# Patient Record
Sex: FEMALE | URBAN - METROPOLITAN AREA
[De-identification: names, ages, dates, MRNs, and addresses within clinical notes are randomized per-mention and may not be internally consistent; named-entity substitution may affect disease eponyms.]

---

## 2018-04-18 ENCOUNTER — NURSE TRIAGE (OUTPATIENT)
Dept: NURSING | Facility: CLINIC | Age: 38
End: 2018-04-18

## 2018-04-18 NOTE — TELEPHONE ENCOUNTER
Reason for Disposition    [1] MODERATE difficulty breathing (e.g., speaks in phrases, SOB even at rest, pulse 100-120) AND [2] NEW-onset or WORSE than normal    Additional Information    Negative: [1] Breathing stopped AND [2] hasn't returned    Negative: Choking on something    Negative: Severe difficulty breathing (e.g., struggling for each breath, speaks in single words)    Negative: Bluish lips, tongue, or face now    Negative: Difficult to awaken or acting confused  (e.g., disoriented, slurred speech)    Negative: Passed out (i.e., lost consciousness, collapsed and was not responding)    Negative: Wheezing started suddenly after medicine, an allergic food or bee sting    Negative: Stridor    Negative: Slow, shallow and weak breathing    Negative: Sounds like a life-threatening emergency to the triager    Negative: Chest pain    Negative: [1] Wheezing (high pitched whistling sound) AND [2] previous asthma attacks or use of asthma medicines    Negative: [1] Difficulty breathing AND [2] only present when coughing    Negative: [1] Difficulty breathing AND [2] only from stuffy or runny nose    Protocols used: BREATHING DIFFICULTY-ADULT-AH

## 2018-04-18 NOTE — TELEPHONE ENCOUNTER
Patient called and tonsilitis along with sleep apnea due to enlarged tonsils on 4/16/18 and prescribed prednisone. Patient reports that she is now having difficulty breathing and her chest feels tight. Patient denies bluish lips/tongue/face, wheezing, stridor, coughing, and runny nose. Patient is able to speak in short phrases but sounded short of breath to this writer. Reviewed guidelines below and advised patient to go to the ER. Patient agreed with plan and reported she will go to the [facility] but she has to drive herself because she does not have anyone that can drive her. Advised patient to call 911 if her difficulty breathing becomes severe. RN advised to call back with any changes, worsening of symptoms, and questions or concerns.

## 2020-02-27 ENCOUNTER — NURSE TRIAGE (OUTPATIENT)
Dept: NURSING | Facility: CLINIC | Age: 40
End: 2020-02-27

## 2020-02-27 NOTE — TELEPHONE ENCOUNTER
Maira is calling in because she has a bad headache, and the veins in her head are popping out and she thinks it is her blood pressure. She says she is going through a very high stress loss last night and she wants to verify that she should be seen. I told her about going through our protocol questions to see where the best place for her to go is , starting with 911 and ED questions then Dr visits and home. She denies the need to do the questions because it's not an ED need. She denies any chest pain or trouble breathing at this time. She is going to go to urgent care to be seen. I told her it is something to be seen for sooner vs. Later and she agreed. Call back with any further concerns.    Patricia Valentine RN/ [facility]      Additional Information    Negative: [1] Numbness (i.e., loss of sensation) of the face, arm or leg on one side of the body AND [2] new onset    Negative: Severe difficulty breathing (e.g., struggling for each breath, speaks in single words)    Negative: Difficult to awaken or acting confused (e.g., disoriented, slurred speech)    Negative: [1] Chest pain lasts > 5 minutes AND [2] history of heart disease  (i.e., heart attack, bypass surgery, angina, angioplasty, CHF)    Negative: Sounds like a life-threatening emergency to the triager    Negative: [1] Chest pain AND [2] took nitrogylcerin AND [3] pain was not relieved    Protocols used: HIGH BLOOD PRESSURE-A-AH

## 2022-12-03 ENCOUNTER — NURSE TRIAGE (OUTPATIENT)
Dept: NURSING | Facility: CLINIC | Age: 42
End: 2022-12-03

## 2022-12-03 NOTE — TELEPHONE ENCOUNTER
"Patient is calling about symptoms that started 9 days ago. Patient believes that she had the flu based on symptoms. And then thinks she had a sinus infection. Today the symptoms of a sinus infection have alleviated but is now having a raised temperature. Patient did not get treatment for the sinus infection but the symptoms have resolved on their own without antibiotics. Patient continues to have a productive cough. Cough is less frequent but still productive. Patient now running a fever again. Temperature is 99.5 and feels chilled and has \"classic feeling of having a fever\".   Protocol recommends see physician with 24 hours  Care advice given. Patient to call back with worsening symptoms.   Elba Pemberton RN   12/03/22 2:53 PM  [facility] Nurse [facility]    Reason for Disposition    [1] Fever returns after gone for over 24 hours AND [2] symptoms worse or not improved    Additional Information    Negative: SEVERE difficulty breathing (e.g., struggling for each breath, speaks in single words)    Negative: Bluish (or gray) lips or face now    Negative: [1] Difficulty breathing AND [2] exposure to flames, smoke, or fumes    Negative: [1] Stridor AND [2] difficulty breathing    Negative: Sounds like a life-threatening emergency to the triager    Negative: [1] Previous asthma attacks AND [2] this feels like asthma attack    Negative: Dry cough (non-productive;  no sputum or minimal clear sputum)    Negative: [1] MODERATE difficulty breathing (e.g., speaks in phrases, SOB even at rest, pulse 100-120) AND [2] still present when not coughing    Negative: Chest pain  (Exception: MILD central chest pain, present only when coughing)    Negative: Patient sounds very sick or weak to the triager    Negative: [1] MILD difficulty breathing (e.g., minimal/no SOB at rest, SOB with walking, pulse <100) AND [2] still present when not coughing    Negative: [1] Coughed up blood AND [2] > 1 tablespoon (15 ml)  (Exception: Blood-tinged " sputum.)    Negative: Fever > 103 F (39.4 C)    Negative: [1] Fever > 101 F (38.3 C) AND [2] age > 60 years    Negative: [1] Fever > 100.0 F (37.8 C) AND [2] bedridden (e.g., nursing home patient, CVA, chronic illness, recovering from surgery)    Negative: [1] Fever > 100.0 F (37.8 C) AND [2] diabetes mellitus or weak immune system (e.g., HIV positive, cancer chemo, splenectomy, organ transplant, chronic steroids)    Negative: Wheezing is present    Negative: SEVERE coughing spells (e.g., whooping sound after coughing, vomiting after coughing)    Negative: [1] Continuous (nonstop) coughing interferes with work or school AND [2] no improvement using cough treatment per Care Advice    Negative: Coughing up alexa-colored (reddish-brown) sputum    Negative: Fever present > 3 days (72 hours)    Protocols used: COUGH - ACUTE [id number]